# Patient Record
Sex: MALE | URBAN - NONMETROPOLITAN AREA
[De-identification: names, ages, dates, MRNs, and addresses within clinical notes are randomized per-mention and may not be internally consistent; named-entity substitution may affect disease eponyms.]

---

## 2022-08-15 ENCOUNTER — OFFICE VISIT (OUTPATIENT)
Dept: URBAN - NONMETROPOLITAN AREA CLINIC 2 | Facility: CLINIC | Age: 62
End: 2022-08-15
Payer: COMMERCIAL

## 2022-08-15 VITALS
BODY MASS INDEX: 29.92 KG/M2 | WEIGHT: 202 LBS | HEIGHT: 69 IN | DIASTOLIC BLOOD PRESSURE: 80 MMHG | TEMPERATURE: 97 F | HEART RATE: 57 BPM | SYSTOLIC BLOOD PRESSURE: 146 MMHG

## 2022-08-15 DIAGNOSIS — K21.9 GASTROESOPHAGEAL REFLUX DISEASE, UNSPECIFIED WHETHER ESOPHAGITIS PRESENT: ICD-10-CM

## 2022-08-15 DIAGNOSIS — Z12.11 COLON CANCER SCREENING: ICD-10-CM

## 2022-08-15 DIAGNOSIS — R10.13 DYSPEPSIA: ICD-10-CM

## 2022-08-15 PROCEDURE — 99204 OFFICE O/P NEW MOD 45 MIN: CPT | Performed by: INTERNAL MEDICINE

## 2022-08-15 RX ORDER — POLYETHYLENE GLYCOL 3350, SODIUM SULFATE, SODIUM CHLORIDE, POTASSIUM CHLORIDE, ASCORBIC ACID, SODIUM ASCORBATE 140-9-5.2G
AS DIRECTED KIT ORAL AS DIRECTED
Qty: 280 GRAM | Refills: 0 | OUTPATIENT
Start: 2022-08-15 | End: 2022-08-16

## 2022-08-15 RX ORDER — ESOMEPRAZOLE MAGNESIUM 20 MG/1
1 CAPSULE CAPSULE, DELAYED RELEASE ORAL ONCE A DAY
Qty: 30 | Status: ACTIVE | COMMUNITY
Start: 2022-08-15

## 2022-08-15 NOTE — HPI-TODAY'S VISIT:
8/15/2022: Initial Gastroenterology Clinic Visit 62 year old gentleman with past medical history of GERD, former tobacco use, who presents for evaluation of GERD and colon cancer screening.  Experiences reflux after consumption of tomato based products and chocolate. He can also experience epigastric discomfort/nausea/abdominal bloatnig consumption of tomato based products and chocolate. Improved with the use of as needed esomeprazole. Symptoms occur every couple of months. He does not require the use of esomperazole on a daily basis. Denies dysphagia, odynophagia, melena, hematochezia, unintentional weight loss.   One bowel movement per day which is brown.    He has never undergone EGD or colonoscopy.  Denies family history of colon polyps or colon cancer.   WOrks as an .

## 2022-08-27 ENCOUNTER — DASHBOARD ENCOUNTERS (OUTPATIENT)
Age: 62
End: 2022-08-27

## 2022-08-27 PROBLEM — 162031009: Status: ACTIVE | Noted: 2022-08-15

## 2022-08-27 PROBLEM — 235595009: Status: ACTIVE | Noted: 2022-08-27

## 2022-08-31 ENCOUNTER — TELEPHONE ENCOUNTER (OUTPATIENT)
Dept: URBAN - NONMETROPOLITAN AREA CLINIC 2 | Facility: CLINIC | Age: 62
End: 2022-08-31

## 2022-08-31 ENCOUNTER — OFFICE VISIT (OUTPATIENT)
Dept: URBAN - NONMETROPOLITAN AREA SURGERY CENTER 1 | Facility: SURGERY CENTER | Age: 62
End: 2022-08-31
Payer: COMMERCIAL

## 2022-08-31 DIAGNOSIS — K29.60 ADENOPAPILLOMATOSIS GASTRICA: ICD-10-CM

## 2022-08-31 DIAGNOSIS — K26.9 CHILDHOOD DUODENAL ULCER: ICD-10-CM

## 2022-08-31 DIAGNOSIS — B96.81 BACTERIAL INFECTION DUE TO H. PYLORI: ICD-10-CM

## 2022-08-31 DIAGNOSIS — D13.0 BENIGN NEOPLASM OF CERVICAL ESOPHAGUS: ICD-10-CM

## 2022-08-31 DIAGNOSIS — K25.4 BLEEDING GASTRIC ULCER: ICD-10-CM

## 2022-08-31 PROBLEM — 300439004: Status: ACTIVE | Noted: 2022-08-31

## 2022-08-31 PROBLEM — 15902003: Status: ACTIVE | Noted: 2022-08-31

## 2022-08-31 PROCEDURE — 43255 EGD CONTROL BLEEDING ANY: CPT | Performed by: INTERNAL MEDICINE

## 2022-08-31 PROCEDURE — G8907 PT DOC NO EVENTS ON DISCHARG: HCPCS | Performed by: INTERNAL MEDICINE

## 2022-08-31 PROCEDURE — 43239 EGD BIOPSY SINGLE/MULTIPLE: CPT | Performed by: INTERNAL MEDICINE

## 2022-08-31 RX ORDER — ESOMEPRAZOLE MAGNESIUM 20 MG/1
1 CAPSULE CAPSULE, DELAYED RELEASE ORAL ONCE A DAY
Qty: 30 | Status: ACTIVE | COMMUNITY
Start: 2022-08-15

## 2022-08-31 RX ORDER — OMEPRAZOLE 40 MG/1
1 CAPSULE CAPSULE, DELAYED RELEASE ORAL EVERY 12 HOURS
Qty: 180 CAPSULE | Refills: 0 | OUTPATIENT
Start: 2022-08-31

## 2022-09-01 LAB
ABSOLUTE BASOPHILS: 90
ABSOLUTE EOSINOPHILS: 120
ABSOLUTE LYMPHOCYTES: 3350
ABSOLUTE MONOCYTES: 740
ABSOLUTE NEUTROPHILS: 5700
BASOPHILS: 0.9
EOSINOPHILS: 1.2
HEMATOCRIT: 52.7
HEMOGLOBIN: 18.3
LYMPHOCYTES: 33.5
MCH: 33.4
MCHC: 34.7
MCV: 96.2
MONOCYTES: 7.4
MPV: 10.5
NEUTROPHILS: 57
PLATELET COUNT: 316
RDW: 13.4
RED BLOOD CELL COUNT: 5.48
WHITE BLOOD CELL COUNT: 10

## 2022-09-06 ENCOUNTER — TELEPHONE ENCOUNTER (OUTPATIENT)
Dept: URBAN - NONMETROPOLITAN AREA CLINIC 2 | Facility: CLINIC | Age: 62
End: 2022-09-06

## 2022-09-08 PROBLEM — 305058001: Status: ACTIVE | Noted: 2022-08-15

## 2022-09-22 ENCOUNTER — TELEPHONE ENCOUNTER (OUTPATIENT)
Dept: URBAN - NONMETROPOLITAN AREA CLINIC 2 | Facility: CLINIC | Age: 62
End: 2022-09-22

## 2022-09-22 RX ORDER — ESOMEPRAZOLE MAGNESIUM 20 MG/1
1 CAPSULE CAPSULE, DELAYED RELEASE ORAL ONCE A DAY
Qty: 30 | Status: ACTIVE | COMMUNITY
Start: 2022-08-15

## 2022-09-22 RX ORDER — OMEPRAZOLE 40 MG/1
1 CAPSULE CAPSULE, DELAYED RELEASE ORAL EVERY 12 HOURS
Qty: 180 CAPSULE | Refills: 0 | Status: ACTIVE | COMMUNITY
Start: 2022-08-31

## 2022-09-22 RX ORDER — BISMUTH SUBCITRATE POTASSIUM, METRONIDAZOLE, TETRACYCLINE HYDROCHLORIDE 140; 125; 125 MG/1; MG/1; MG/1
3 CAPSULES AFTER MEALS AND AT BEDTIME CAPSULE ORAL
Qty: 120 | OUTPATIENT
Start: 2022-09-26 | End: 2022-10-06

## 2022-09-26 PROBLEM — 721730009: Status: ACTIVE | Noted: 2022-09-26

## 2022-09-28 ENCOUNTER — TELEPHONE ENCOUNTER (OUTPATIENT)
Dept: URBAN - NONMETROPOLITAN AREA CLINIC 2 | Facility: CLINIC | Age: 62
End: 2022-09-28

## 2022-11-11 ENCOUNTER — CLAIMS CREATED FROM THE CLAIM WINDOW (OUTPATIENT)
Dept: URBAN - NONMETROPOLITAN AREA SURGERY CENTER 1 | Facility: SURGERY CENTER | Age: 62
End: 2022-11-11
Payer: COMMERCIAL

## 2022-11-11 ENCOUNTER — CLAIMS CREATED FROM THE CLAIM WINDOW (OUTPATIENT)
Dept: URBAN - NONMETROPOLITAN AREA SURGERY CENTER 1 | Facility: SURGERY CENTER | Age: 62
End: 2022-11-11

## 2022-11-11 ENCOUNTER — CLAIMS CREATED FROM THE CLAIM WINDOW (OUTPATIENT)
Dept: URBAN - METROPOLITAN AREA CLINIC 4 | Facility: CLINIC | Age: 62
End: 2022-11-11
Payer: COMMERCIAL

## 2022-11-11 DIAGNOSIS — K29.60 ADENOPAPILLOMATOSIS GASTRICA: ICD-10-CM

## 2022-11-11 DIAGNOSIS — D12.2 ADENOMA OF ASCENDING COLON: ICD-10-CM

## 2022-11-11 DIAGNOSIS — K31.89 OTHER DISEASES OF STOMACH AND DUODENUM: ICD-10-CM

## 2022-11-11 DIAGNOSIS — K21.9 ACID REFLUX: ICD-10-CM

## 2022-11-11 DIAGNOSIS — D12.3 ADENOMA OF TRANSVERSE COLON: ICD-10-CM

## 2022-11-11 DIAGNOSIS — Z12.11 COLON CANCER SCREENING: ICD-10-CM

## 2022-11-11 DIAGNOSIS — D12.8 ADENOMATOUS POLYP OF RECTUM: ICD-10-CM

## 2022-11-11 DIAGNOSIS — K21.9 GASTRO-ESOPHAGEAL REFLUX DISEASE WITHOUT ESOPHAGITIS: ICD-10-CM

## 2022-11-11 PROCEDURE — 43239 EGD BIOPSY SINGLE/MULTIPLE: CPT | Performed by: INTERNAL MEDICINE

## 2022-11-11 PROCEDURE — G8907 PT DOC NO EVENTS ON DISCHARG: HCPCS | Performed by: INTERNAL MEDICINE

## 2022-11-11 PROCEDURE — 45385 COLONOSCOPY W/LESION REMOVAL: CPT | Performed by: INTERNAL MEDICINE

## 2022-11-11 PROCEDURE — 45380 COLONOSCOPY AND BIOPSY: CPT | Performed by: INTERNAL MEDICINE

## 2022-11-11 PROCEDURE — 88305 TISSUE EXAM BY PATHOLOGIST: CPT | Performed by: PATHOLOGY

## 2022-11-11 PROCEDURE — 88342 IMHCHEM/IMCYTCHM 1ST ANTB: CPT | Performed by: PATHOLOGY

## 2022-11-11 PROCEDURE — 88312 SPECIAL STAINS GROUP 1: CPT | Performed by: PATHOLOGY

## 2022-11-11 RX ORDER — ESOMEPRAZOLE MAGNESIUM 20 MG/1
1 CAPSULE CAPSULE, DELAYED RELEASE ORAL ONCE A DAY
Qty: 30 | Status: ACTIVE | COMMUNITY
Start: 2022-08-15

## 2022-11-11 RX ORDER — OMEPRAZOLE 40 MG/1
1 CAPSULE CAPSULE, DELAYED RELEASE ORAL EVERY 12 HOURS
Qty: 180 CAPSULE | Refills: 0 | Status: ACTIVE | COMMUNITY
Start: 2022-08-31

## 2022-12-01 ENCOUNTER — OFFICE VISIT (OUTPATIENT)
Dept: URBAN - NONMETROPOLITAN AREA CLINIC 2 | Facility: CLINIC | Age: 62
End: 2022-12-01

## 2022-12-01 NOTE — HPI-TODAY'S VISIT:
8/15/2022: Initial Gastroenterology Clinic Visit  62 year old gentleman with past medical history of GERD, former tobacco use, who presents for evaluation of GERD and colon cancer screening.  Experiences reflux after consumption of tomato based products and chocolate. He can also experience epigastric discomfort/nausea/abdominal bloatnig consumption of tomato based products and chocolate. Improved with the use of as needed esomeprazole. Symptoms occur every couple of months. He does not require the use of esomperazole on a daily basis. Denies dysphagia, odynophagia, melena, hematochezia, unintentional weight loss.   One bowel movement per day which is brown.    He has never undergone EGD or colonoscopy.  Denies family history of colon polyps or colon cancer.   Works as an .  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  11/11/2022: EGD with one tongue of salmon colored mucosa was found at the gastroesophageal junction measuring 1 cm in length with biopsies showing __________, small hiatal hernia, no endoscopic evidence of ulceration in the entire examined stomach, two hemostatic clips noted in the gastric body which were placed during upper endoscopy in 8/2022, erythematous mucosa without bleeding was found in the gastric antrum with biopsies showing __________, the examined duodenum was normal with biopsies showing ___________. 11/11/2022: Colonoscopy with three sessile polyps in the ascending colon ranging from 3 to 5 mm which were removed with pathology returning as _________, 2 mm polyp in the ascending colon which was removed with pathology returning as __________, two sessile polyps in the transverse colon ranging from 3 to 5 mm removed with pathology returning as _________, 5 mm polyp in the sigmoid colon with pathology returning as _________. Three sessile polyps in the rectum measuring 4 to 7 mm with pathology retuning as ________, multiple small and large mouthed diverticula were found I the sigmoid colon.  Plan: -ENT. -Re-test for Helicobacter pylori. -Wean omeprazole.

## 2025-03-20 ENCOUNTER — OFFICE VISIT (OUTPATIENT)
Dept: URBAN - NONMETROPOLITAN AREA CLINIC 2 | Facility: CLINIC | Age: 65
End: 2025-03-20
Payer: SELF-PAY

## 2025-03-20 ENCOUNTER — LAB OUTSIDE AN ENCOUNTER (OUTPATIENT)
Dept: URBAN - NONMETROPOLITAN AREA CLINIC 2 | Facility: CLINIC | Age: 65
End: 2025-03-20

## 2025-03-20 VITALS
WEIGHT: 240 LBS | BODY MASS INDEX: 35.55 KG/M2 | SYSTOLIC BLOOD PRESSURE: 153 MMHG | TEMPERATURE: 98 F | HEART RATE: 59 BPM | HEIGHT: 69 IN | DIASTOLIC BLOOD PRESSURE: 88 MMHG

## 2025-03-20 DIAGNOSIS — A04.8 HELICOBACTER PYLORI (H. PYLORI) INFECTION: ICD-10-CM

## 2025-03-20 DIAGNOSIS — K27.9 PUD (PEPTIC ULCER DISEASE): ICD-10-CM

## 2025-03-20 DIAGNOSIS — Z86.0101 PERSONAL HISTORY OF ADENOMATOUS AND SERRATED COLON POLYPS: ICD-10-CM

## 2025-03-20 PROBLEM — 721730009: Status: ACTIVE | Noted: 2025-03-20

## 2025-03-20 PROBLEM — 428283002: Status: ACTIVE | Noted: 2025-03-20

## 2025-03-20 PROBLEM — 13200003: Status: ACTIVE | Noted: 2025-03-20

## 2025-03-20 PROCEDURE — 99214 OFFICE O/P EST MOD 30 MIN: CPT | Performed by: NURSE PRACTITIONER

## 2025-03-20 NOTE — HPI-TODAY'S VISIT:
3/20/2025 The patient presents for colonoscopy with personal history of colon polyps.  He had a colonoscopy in 2022 with 10 hyperplastic/tubular adenomas by Dr. Pitt.  He was recommended to repeat in 1 year, he agrees to schedule today.  He also has a history of peptic ulcer disease secondary to NSAIDs and H. pylori.  He is status post Pylera with repeat EGD with negative H. pylori.  Today he denies any upper GI complaints.  MB

## 2025-06-06 ENCOUNTER — CLAIMS CREATED FROM THE CLAIM WINDOW (OUTPATIENT)
Dept: URBAN - NONMETROPOLITAN AREA SURGERY CENTER 1 | Facility: SURGERY CENTER | Age: 65
End: 2025-06-06
Payer: COMMERCIAL

## 2025-06-06 ENCOUNTER — CLAIMS CREATED FROM THE CLAIM WINDOW (OUTPATIENT)
Dept: URBAN - METROPOLITAN AREA CLINIC 4 | Facility: CLINIC | Age: 65
End: 2025-06-06
Payer: COMMERCIAL

## 2025-06-06 DIAGNOSIS — D12.0 ADENOMA OF CECUM: ICD-10-CM

## 2025-06-06 DIAGNOSIS — K63.5 POLYP OF COLON: ICD-10-CM

## 2025-06-06 DIAGNOSIS — Z86.0100 HISTORY OF COLON POLYPS: ICD-10-CM

## 2025-06-06 DIAGNOSIS — E66.9 OBESITY WITH BODY MASS INDEX 30 OR GREATER: ICD-10-CM

## 2025-06-06 DIAGNOSIS — Z12.11 ENCOUNTER FOR COLONOSCOPY DUE TO HISTORY OF COLONIC POLYP: ICD-10-CM

## 2025-06-06 DIAGNOSIS — K63.5 BENIGN COLON POLYP: ICD-10-CM

## 2025-06-06 DIAGNOSIS — Z86.0100 PERSONAL HISTORY OF COLON POLYPS, UNSPECIFIED: ICD-10-CM

## 2025-06-06 DIAGNOSIS — D12.0 BENIGN NEOPLASM OF CECUM: ICD-10-CM

## 2025-06-06 DIAGNOSIS — D12.4 BENIGN NEOPLASM OF DESCENDING COLON: ICD-10-CM

## 2025-06-06 PROCEDURE — 45385 COLONOSCOPY W/LESION REMOVAL: CPT | Performed by: INTERNAL MEDICINE

## 2025-06-06 PROCEDURE — 0529F INTRVL 3/>YR PTS CLNSCP DOCD: CPT | Performed by: INTERNAL MEDICINE

## 2025-06-06 PROCEDURE — 00812 ANES LWR INTST SCR COLSC: CPT | Performed by: NURSE ANESTHETIST, CERTIFIED REGISTERED

## 2025-06-06 PROCEDURE — 88305 TISSUE EXAM BY PATHOLOGIST: CPT | Performed by: PATHOLOGY
